# Patient Record
Sex: MALE | Race: WHITE | NOT HISPANIC OR LATINO | URBAN - NONMETROPOLITAN AREA
[De-identification: names, ages, dates, MRNs, and addresses within clinical notes are randomized per-mention and may not be internally consistent; named-entity substitution may affect disease eponyms.]

---

## 2018-04-12 PROBLEM — Z96.1: Noted: 2018-04-12

## 2018-04-12 PROBLEM — H02.831: Noted: 2018-04-12

## 2018-04-12 PROBLEM — H26.493: Noted: 2018-04-12

## 2018-04-12 PROBLEM — H02.834: Noted: 2018-04-12

## 2019-04-18 ENCOUNTER — IMPORTED ENCOUNTER (OUTPATIENT)
Dept: URBAN - NONMETROPOLITAN AREA CLINIC 1 | Facility: CLINIC | Age: 84
End: 2019-04-18

## 2019-04-18 PROCEDURE — 92015 DETERMINE REFRACTIVE STATE: CPT

## 2019-04-18 PROCEDURE — 92014 COMPRE OPH EXAM EST PT 1/>: CPT

## 2019-04-18 NOTE — PATIENT DISCUSSION
Pseudophakia OU with PCO OU- Discussed diagnosis in detail with patient - Patient is stable and doing well- Patient states that he has noticed decreased vision- BAT done today OD 20/60 and OS 20/70- PCO noted OU recommend YAG PC eval with Dr. Zainab Chatman or Dr. Nancy Dallas.  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

## 2019-06-21 ENCOUNTER — IMPORTED ENCOUNTER (OUTPATIENT)
Dept: URBAN - NONMETROPOLITAN AREA CLINIC 1 | Facility: CLINIC | Age: 84
End: 2019-06-21

## 2019-06-21 PROCEDURE — 66821 AFTER CATARACT LASER SURGERY: CPT

## 2019-06-21 PROCEDURE — 92014 COMPRE OPH EXAM EST PT 1/>: CPT

## 2019-06-21 NOTE — PATIENT DISCUSSION
PCO OU visually significant:  -Explained PCO and RBAs of YAG Capsulotomy to pt. -Pt elects to proceed. YAG Caps OD today and YAG Caps OS in 1-2 weeks.

## 2019-08-02 ENCOUNTER — IMPORTED ENCOUNTER (OUTPATIENT)
Dept: URBAN - NONMETROPOLITAN AREA CLINIC 1 | Facility: CLINIC | Age: 84
End: 2019-08-02

## 2019-08-02 PROBLEM — Z96.1: Noted: 2019-08-02

## 2019-08-02 PROBLEM — H26.493: Noted: 2018-04-12

## 2019-08-02 PROBLEM — H35.3131: Noted: 2019-08-02

## 2019-08-02 PROCEDURE — 66821 AFTER CATARACT LASER SURGERY: CPT

## 2019-08-02 PROCEDURE — 92134 CPTRZ OPH DX IMG PST SGM RTA: CPT

## 2019-08-02 NOTE — PATIENT DISCUSSION
PCO OS visually significant:  -Explained PCO and RBAs of YAG Capsulotomy to pt. -Pt elects to proceed. YAG Caps OS today Wrtitten consent signed and and obtained prior to procedure.  s/p YAG PC OD 6/21/19-Pt doing well s/p-Capsule open ARMD OU (early dry) - Discussed diagnosis in detail with patient- Recommend  patient start eye vitamins daily such as Preservision- Recommend that patient start following the [address] patient to call or come into the office ASAP if any changes noted from today-Sasmple of Areds was given to patient in office today -OCT mac was obtained today shows no mac edema - Continue to monitor; Dr's Notes: OCT MAC 8/2/19

## 2019-09-09 ENCOUNTER — IMPORTED ENCOUNTER (OUTPATIENT)
Dept: URBAN - NONMETROPOLITAN AREA CLINIC 1 | Facility: CLINIC | Age: 84
End: 2019-09-09

## 2019-09-09 PROBLEM — Z96.1: Noted: 2019-09-09

## 2019-09-09 PROBLEM — Z98.890: Noted: 2019-09-09

## 2019-09-09 PROBLEM — H35.3131: Noted: 2019-09-09

## 2019-09-09 PROCEDURE — 99024 POSTOP FOLLOW-UP VISIT: CPT

## 2019-09-09 NOTE — PATIENT DISCUSSION
Pseudophakia OU - Discussed diagnosis in detail with patient - S/P YAG PC OU- good central opening - New glasses Rx given today but patient okay with OTC readers - Continue to monitor ARMD OU (early dry) - Discussed diagnosis in detail with patient- Recommend  patient continue eye vitamins daily such as Preservision- Recommend that patient continue following the [address] patient to call or come into the office ASAP if any changes noted from today- OCT done previously shows no edema  - Continue to monitor; 's Notes: OCT MAC 8/2/19

## 2020-09-14 ENCOUNTER — IMPORTED ENCOUNTER (OUTPATIENT)
Dept: URBAN - NONMETROPOLITAN AREA CLINIC 1 | Facility: CLINIC | Age: 85
End: 2020-09-14

## 2020-09-14 PROBLEM — Z96.1: Noted: 2020-09-14

## 2020-09-14 PROBLEM — Z98.890: Noted: 2020-09-14

## 2020-09-14 PROBLEM — H35.3131: Noted: 2020-09-14

## 2020-09-14 PROCEDURE — 92015 DETERMINE REFRACTIVE STATE: CPT

## 2020-09-14 PROCEDURE — 92014 COMPRE OPH EXAM EST PT 1/>: CPT

## 2020-09-14 PROCEDURE — 92134 CPTRZ OPH DX IMG PST SGM RTA: CPT

## 2020-09-14 NOTE — PATIENT DISCUSSION
Pseudophakia OU - Discussed diagnosis in detail with patient - S/P YAG PC OU- good central opening - Continue to monitor ARMD OU (early dry) - Discussed diagnosis in detail with patient- Recommend  patient continue eye vitamins daily such as Preservision- Recommend that patient continue following the [address] patient to call or come into the office ASAP if any changes noted from today. Patient states that he has not been following the [address].  Another grid given 9/14/20 with instructions on how to use by Dr. Sally Johnson done today shows Drusen OU but stable at this time - Continue to monitorPresbyopia OU - Discussed diagnosis in detail with patient - New glasses Rx given today but patient okay with OTC readers - Continue to monitor; 's Notes: OCT MAC 8/2/19

## 2021-01-07 ENCOUNTER — IMPORTED ENCOUNTER (OUTPATIENT)
Dept: URBAN - NONMETROPOLITAN AREA CLINIC 1 | Facility: CLINIC | Age: 86
End: 2021-01-07

## 2021-01-07 PROBLEM — Z96.1: Noted: 2021-01-07

## 2021-01-07 PROBLEM — Z98.890: Noted: 2021-01-07

## 2021-01-07 PROBLEM — H35.3131: Noted: 2021-01-07

## 2021-01-07 PROBLEM — H43.813: Noted: 2021-01-07

## 2021-01-07 PROCEDURE — 99213 OFFICE O/P EST LOW 20 MIN: CPT

## 2021-01-07 NOTE — PATIENT DISCUSSION
Floaters OU- Discussed  signs/symptoms of RD or tear. - Discussed in detail the nature of flashes/floaters and to call if there is a sudden increase in their number.- Continue to monitor Presbyopia OU - Discussed diagnosis in detail with patient - MR done today by Dr. Theo Sheets patient is dilated would like to repeat when not dilated and patient can messure distance with computer - Continue to monitor- RTC for refraction ---------------------------------------previous notes-----------------------------Pseudophakia OU - Discussed diagnosis in detail with patient - S/P YAG PC OU- good central opening - Continue to monitor ARMD OU (early dry) - Discussed diagnosis in detail with patient- Recommend  patient continue eye vitamins daily such as Preservision. Samples given 1/7/21 - Recommend that patient continue following the [employer] patient to call or come into the office ASAP if any changes noted from today. Patient states that he has not been following the [employer].  Another grid given 9/14/20 1/7/21 with instructions on how to use by Dr. Theo Sheets - Carlene Choi done previously shows Drusen OU but stable at this time - Continue to monitor; 's Notes: OCT MAC 8/2/19

## 2021-01-18 ENCOUNTER — IMPORTED ENCOUNTER (OUTPATIENT)
Dept: URBAN - NONMETROPOLITAN AREA CLINIC 1 | Facility: CLINIC | Age: 86
End: 2021-01-18

## 2021-01-18 PROBLEM — H43.813: Noted: 2021-01-18

## 2021-01-18 PROBLEM — H35.3131: Noted: 2021-01-18

## 2021-01-18 PROBLEM — Z96.1: Noted: 2021-01-18

## 2021-01-18 PROBLEM — H52.4: Noted: 2021-01-18

## 2021-01-18 PROCEDURE — 92015 DETERMINE REFRACTIVE STATE: CPT

## 2021-01-18 NOTE — PATIENT DISCUSSION
Presbyopia OU - Discussed refractive status in detail with patient - MR done today by Dr. Sally Russell new computer Rx given today - Continue to monitorFloaters OU- Discussed  signs/symptoms of RD or tear. - Discussed in detail the nature of flashes/floaters and to call if there is a sudden increase in their number.- Continue to monitor Pseudophakia OU - Discussed diagnosis in detail with patient - S/P YAG PC OU- good central opening - Continue to monitor ARMD OU (early dry) - Discussed diagnosis in detail with patient- Recommend  patient continue eye vitamins daily such as Preservision. Samples given 1/7/21 - Recommend that patient continue following the [employer] patient to call or come into the office ASAP if any changes noted from today. Patient states that he has not been following the [employer].  Another grid given 9/14/20 1/7/21 with instructions on how to use by Dr. Sally Russell - Ariane Johnson done previously shows Drusen OU but stable at this time - Continue to monitor; Dr's Notes: OCT MAC 8/2/19

## 2021-09-16 ENCOUNTER — IMPORTED ENCOUNTER (OUTPATIENT)
Dept: URBAN - NONMETROPOLITAN AREA CLINIC 1 | Facility: CLINIC | Age: 86
End: 2021-09-16

## 2021-09-16 PROBLEM — H43.813: Noted: 2021-09-16

## 2021-09-16 PROBLEM — H52.4: Noted: 2021-09-16

## 2021-09-16 PROBLEM — E11.9: Noted: 2021-09-16

## 2021-09-16 PROBLEM — Z96.1: Noted: 2021-09-16

## 2021-09-16 PROBLEM — H35.3131: Noted: 2021-09-16

## 2021-09-16 PROCEDURE — 92134 CPTRZ OPH DX IMG PST SGM RTA: CPT

## 2021-09-16 PROCEDURE — 92015 DETERMINE REFRACTIVE STATE: CPT

## 2021-09-16 PROCEDURE — 99213 OFFICE O/P EST LOW 20 MIN: CPT

## 2021-09-16 NOTE — PATIENT DISCUSSION
Presbyopia OU - Discussed refractive status in detail with patient - New glasses RX given today- Continue to monitorIDDM 30+ years- Discussed diagnosis in detail with patient- Stressed importance of good blood sugar control- Patient has recently put on insulin 6 months ago - Recommend no soda’s- Cotton wool spot noted today in OS - Continue to monitor- 4 months F/U with [employer] Floaters OU- Discussed  signs/symptoms of RD or tear. - Discussed in detail the nature of flashes/floaters and to call if there is a sudden increase in their number.- Continue to monitor Pseudophakia OU - Discussed diagnosis in detail with patient - S/P YAG PC OU- good central opening - Continue to monitor ARMD OU (early dry) - Discussed diagnosis in detail with patient- Recommend  patient continue eye vitamins daily such as Preservision. Samples given 1/7/21 - Recommend that patient continue following the [employer] patient to call or come into the office ASAP if any changes noted from today. Patient states that he has not been following the [address].  Another grid given 9/14/20 1/7/21 with instructions on how to use by Dr. Jaspal Rodrigues done today shows Drusen OU but stable at this time - Continue to monitor; 's Notes: OCT MAC 8/2/19

## 2022-01-28 ENCOUNTER — FOLLOW UP (OUTPATIENT)
Dept: URBAN - NONMETROPOLITAN AREA CLINIC 1 | Facility: CLINIC | Age: 87
End: 2022-01-28

## 2022-01-28 DIAGNOSIS — H35.3131: ICD-10-CM

## 2022-01-28 DIAGNOSIS — E11.9: ICD-10-CM

## 2022-01-28 PROCEDURE — 99214 OFFICE O/P EST MOD 30 MIN: CPT

## 2022-01-28 PROCEDURE — 92250 FUNDUS PHOTOGRAPHY W/I&R: CPT

## 2022-01-28 ASSESSMENT — VISUAL ACUITY
OD_SC: 20/29-1
OS_SC: 20/29-1

## 2022-01-28 ASSESSMENT — TONOMETRY
OD_IOP_MMHG: 12
OS_IOP_MMHG: 12

## 2022-01-28 NOTE — PATIENT DISCUSSION
Discussed diagnosis in detail with patient. New Glasses RX given last visit. Had a long discussion today about OTC readers of +2.50 to +3.00, depending on distance for computer vs. reading. Monitor PRN.

## 2022-01-28 NOTE — PATIENT DISCUSSION
Discussed diagnosis in detail with patient. No diabetic retinopathy noted on exam today. Stressed importance of good blood sugar control and how it can affect ocular health/vision. Recommend no soda’s.  Letter will be sent to PCP today as well. Continue to monitor closely for changes.

## 2022-01-28 NOTE — PATIENT DISCUSSION
Discussed diagnosis in detail with patient. Signs/symptoms associated with progression discussed. Recommend  patient continue eye vitamins daily such as PreserVision. Recommend that patient follow the [address], patient to call or come into the office ASAP if any changes noted from today. Continue to monitor.

## 2022-04-15 ASSESSMENT — VISUAL ACUITY
OD_CC: 20/60-1
OS_CC: 20/32+2
OD_CC: 20/50-
OD_CC: 20/40
OS_CC: 20/25-
OS_CC: 20/30-2
OS_GLARE: 20/40+2
OD_SC: J5
OS_CC: 20/40+2
OS_PH: 20/40+
OD_CC: 20/40-2
OS_GLARE: 20/40
OD_PH: 20/30+1
OD_PH: 20/30
OS_GLARE: 20/70
OS_CC: 20/25-2
OD_PH: 20/25-2
OD_GLARE: 20/60
OS_PH: 20/25
OD_CC: 20/50
OS_SC: J5
OD_GLARE: 20/50+1
OD_CC: 20/30+
OS_CC: 20/30-
OS_CC: 20/40-
OD_CC: 20/40
OD_PH: 20/40
OD_PH: 20/40-1
OS_CC: 20/25-2
OD_CC: 20/40+

## 2022-04-15 ASSESSMENT — TONOMETRY
OS_IOP_MMHG: 11
OS_IOP_MMHG: 18
OD_IOP_MMHG: 10
OD_IOP_MMHG: 14
OD_IOP_MMHG: 12
OS_IOP_MMHG: 14
OD_IOP_MMHG: 11
OD_IOP_MMHG: 17
OD_IOP_MMHG: 18
OS_IOP_MMHG: 18
OS_IOP_MMHG: 12
OD_IOP_MMHG: 13
OS_IOP_MMHG: 13
OS_IOP_MMHG: 12

## 2022-08-15 ENCOUNTER — FOLLOW UP (OUTPATIENT)
Dept: URBAN - NONMETROPOLITAN AREA CLINIC 1 | Facility: CLINIC | Age: 87
End: 2022-08-15

## 2022-08-15 DIAGNOSIS — H35.3131: ICD-10-CM

## 2022-08-15 DIAGNOSIS — H52.4: ICD-10-CM

## 2022-08-15 PROCEDURE — 92015 DETERMINE REFRACTIVE STATE: CPT

## 2022-08-15 PROCEDURE — 92134 CPTRZ OPH DX IMG PST SGM RTA: CPT

## 2022-08-15 PROCEDURE — 99214 OFFICE O/P EST MOD 30 MIN: CPT

## 2022-08-15 ASSESSMENT — VISUAL ACUITY
OS_SC: 20/22-2
OD_SC: 20/32-2

## 2022-08-15 ASSESSMENT — TONOMETRY
OD_IOP_MMHG: 16
OS_IOP_MMHG: 16

## 2022-08-15 NOTE — PATIENT DISCUSSION
Discussed diagnosis in detail with patient. New Glasses RX given today. Had a long discussion today about OTC readers of +2.50 to +3.00, depending on distance for computer vs. reading. Monitor PRN.